# Patient Record
Sex: FEMALE | URBAN - METROPOLITAN AREA
[De-identification: names, ages, dates, MRNs, and addresses within clinical notes are randomized per-mention and may not be internally consistent; named-entity substitution may affect disease eponyms.]

---

## 2017-09-11 ENCOUNTER — IMPORTED ENCOUNTER (OUTPATIENT)
Dept: URBAN - METROPOLITAN AREA CLINIC 1 | Facility: CLINIC | Age: 62
End: 2017-09-11

## 2017-09-11 PROBLEM — H40.023: Noted: 2017-09-11

## 2017-09-11 PROBLEM — H25.813: Noted: 2017-09-11

## 2017-09-11 PROCEDURE — 92004 COMPRE OPH EXAM NEW PT 1/>: CPT

## 2017-09-11 PROCEDURE — 92250 FUNDUS PHOTOGRAPHY W/I&R: CPT

## 2017-09-11 NOTE — PATIENT DISCUSSION
1.  Cataract OU: Observe for now without intervention. The patient was advised to contact us if any change or worsening of vision2. Glaucoma Suspect OU : (CD 0.6/0.55) H/o treatment with Xalatan in the Past with Dr. Celestina Mark. Neg Fm Hx. IOP elevated OU. Disc photos today show disc cupping. Pt should return for baseline w/u. Patient is considered high risk3. H/o Iritis OU- Etiology uncertain currently quiet on no gtts. Pt states has had no previous w/u. My feeling is patient was actually having episodes of Conjunctivitis. Return for an appointment in 4 mo 10 OCT/ VF 24-2 OU with Dr. Constance Funk.

## 2022-04-02 ASSESSMENT — VISUAL ACUITY
OD_CC: J1+
OS_SC: 20/20-1
OD_SC: 20/20
OS_CC: J1+

## 2022-04-02 ASSESSMENT — TONOMETRY
OS_IOP_MMHG: 22
OD_IOP_MMHG: 23